# Patient Record
Sex: FEMALE | Race: WHITE | NOT HISPANIC OR LATINO
[De-identification: names, ages, dates, MRNs, and addresses within clinical notes are randomized per-mention and may not be internally consistent; named-entity substitution may affect disease eponyms.]

---
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
## 2024-04-23 ENCOUNTER — OFFICE VISIT (OUTPATIENT)
Dept: INTERNAL MEDICINE | Facility: OTHER | Age: 19
End: 2024-04-23

## 2024-04-23 VITALS
WEIGHT: 217 LBS | SYSTOLIC BLOOD PRESSURE: 105 MMHG | HEIGHT: 64 IN | HEART RATE: 88 BPM | BODY MASS INDEX: 37.05 KG/M2 | OXYGEN SATURATION: 97 % | DIASTOLIC BLOOD PRESSURE: 71 MMHG | TEMPERATURE: 97.8 F

## 2024-04-23 DIAGNOSIS — L29.9 PRURITUS: ICD-10-CM

## 2024-04-23 DIAGNOSIS — L40.0 PSORIASIS VULGARIS: ICD-10-CM

## 2024-04-23 PROCEDURE — 3074F SYST BP LT 130 MM HG: CPT | Performed by: DERMATOLOGY

## 2024-04-23 PROCEDURE — 3078F DIAST BP <80 MM HG: CPT | Performed by: DERMATOLOGY

## 2024-04-23 PROCEDURE — 99203 OFFICE O/P NEW LOW 30 MIN: CPT | Performed by: DERMATOLOGY

## 2024-04-23 RX ORDER — CLOBETASOL PROPIONATE 0.46 MG/ML
SOLUTION TOPICAL
Qty: 50 ML | Refills: 5 | Status: SHIPPED | OUTPATIENT
Start: 2024-04-23

## 2024-04-23 RX ORDER — CALCIPOTRIENE 0.05 MG/ML
SOLUTION TOPICAL
Qty: 60 ML | Refills: 3 | Status: SHIPPED | OUTPATIENT
Start: 2024-04-23

## 2024-04-23 NOTE — PROGRESS NOTES
"James Briggs  2005  [id number]    Consultation             Vitals:    04/23/24 1033   BP: 105/71   BP Location: Right arm   Patient Position: Sitting   BP Cuff Size: Large adult   Pulse: 88   Temp: 36.6 °C (97.8 °F)   TempSrc: Temporal   SpO2: 97%   Weight: 98.4 kg (217 lb)   Height: 1.626 m (5' 4\")       Chief Complaint   Patient presents with    Establish Care     Pt has psoriasis under her eyes and on the back of her neck for years. Pt has tried eucrisa and cortisone but it burned her eyes. Triamcinolone helped but she experienced pressure behind her eyes for long term use. Pt experiences waking up with \"swollen, flaky dry eyes\"     ___________________________________________________________________            History of Present Illness (HPI)   C/o rash that started on post scalp about 4 years.  Itchy and scaly.   Then rash spread to eyelids.   Prior rx: hydrocortisone, eucrisa, triamcinolone.     No seasonal allergies    FH no rashes        Dr. Serrano has referred for a consultation to evaluate rash    Current Outpatient Medications on File Prior to Visit   Medication Sig Dispense Refill    ibuprofen (MOTRIN) 100 MG/5ML SUSP Take 10 mg/kg by mouth every 6 hours as needed. (Patient not taking: Reported on 4/23/2024)       No current facility-administered medications on file prior to visit.     Codeine      Review of Systems:        General: Denies fever      Hematologic: no excessive bleeding problems              History reviewed. No pertinent past medical history.  Skin Cancer  no h/o    Social History     Tobacco Use    Smoking status: Never    Smokeless tobacco: Never   Vaping Use    Vaping Use: Never used   Substance Use Topics    Alcohol use: Never    Drug use: Never     Sunscreen Use:Yes    History reviewed. No pertinent surgical history.        History reviewed. No pertinent family history.      Physical Exam:   Constitutional: Well nourished, NAD, pleasant  alert and cooperative; normal " mood and affect; normal attention span and concentration.    Skin focused exam   R medial eyelid eczematous plaque  Post scalp psoriasiform plaque                Assessment and Plan:   1. Psoriasis vulgaris  Will treat as psoriasis. Can try clobetasol to scalp.   Try calcipotriene to eyelid and scalp. Pt will try to get insurance coverage resolved.   If not better, consider pimecrolimus (atopic derm)      2. Pruritus  Can improve with topicals.       Denise Hale M.D.   Return in about 3 months (around 7/23/2024).

## 2024-04-23 NOTE — PATIENT INSTRUCTIONS
Can try anti dandruff shampoos for scalp - head and shoulders, selsun   Twice a week.     Can try clobetasol prescription steroid for itching to scalp once a day for a week at a time.     Try calcipotriene vitamin D to eyelid and scalp daily.